# Patient Record
Sex: FEMALE
[De-identification: names, ages, dates, MRNs, and addresses within clinical notes are randomized per-mention and may not be internally consistent; named-entity substitution may affect disease eponyms.]

---

## 2023-11-01 PROBLEM — Z00.00 ENCOUNTER FOR PREVENTIVE HEALTH EXAMINATION: Status: ACTIVE | Noted: 2023-11-01

## 2023-11-02 ENCOUNTER — APPOINTMENT (OUTPATIENT)
Dept: ANTEPARTUM | Facility: CLINIC | Age: 38
End: 2023-11-02
Payer: MEDICAID

## 2023-11-02 ENCOUNTER — ASOB RESULT (OUTPATIENT)
Age: 38
End: 2023-11-02

## 2023-11-02 PROCEDURE — 76813 OB US NUCHAL MEAS 1 GEST: CPT | Mod: 59

## 2023-11-02 PROCEDURE — 76801 OB US < 14 WKS SINGLE FETUS: CPT

## 2023-11-29 ENCOUNTER — APPOINTMENT (OUTPATIENT)
Dept: ANTEPARTUM | Facility: CLINIC | Age: 38
End: 2023-11-29
Payer: MEDICAID

## 2023-11-29 ENCOUNTER — ASOB RESULT (OUTPATIENT)
Age: 38
End: 2023-11-29

## 2023-11-29 PROCEDURE — 76805 OB US >/= 14 WKS SNGL FETUS: CPT

## 2023-12-18 ENCOUNTER — APPOINTMENT (OUTPATIENT)
Dept: MATERNAL FETAL MEDICINE | Facility: CLINIC | Age: 38
End: 2023-12-18
Payer: MEDICAID

## 2023-12-18 ENCOUNTER — ASOB RESULT (OUTPATIENT)
Age: 38
End: 2023-12-18

## 2023-12-18 DIAGNOSIS — O28.9 UNSPECIFIED ABNORMAL FINDINGS ON ANTENATAL SCREENING OF MOTHER: ICD-10-CM

## 2023-12-18 PROCEDURE — 99204 OFFICE O/P NEW MOD 45 MIN: CPT | Mod: 95

## 2023-12-19 PROBLEM — O28.9 ABNORMAL FINDING ON ANTENATAL SCREEN: Status: ACTIVE | Noted: 2023-12-19

## 2023-12-28 ENCOUNTER — ASOB RESULT (OUTPATIENT)
Age: 38
End: 2023-12-28

## 2023-12-28 ENCOUNTER — APPOINTMENT (OUTPATIENT)
Dept: ANTEPARTUM | Facility: CLINIC | Age: 38
End: 2023-12-28
Payer: MEDICAID

## 2023-12-28 PROCEDURE — 76811 OB US DETAILED SNGL FETUS: CPT

## 2023-12-29 LAB
CMV IGG SERPL QL: 3.8 U/ML
CMV IGG SERPL-IMP: POSITIVE
CMV IGM SERPL QL: <8 AU/ML
CMV IGM SERPL QL: NEGATIVE

## 2024-01-03 LAB — CMV IGG AVIDITY SERPL IA-RTO: 0.88

## 2024-01-04 ENCOUNTER — NON-APPOINTMENT (OUTPATIENT)
Age: 39
End: 2024-01-04

## 2024-01-10 ENCOUNTER — TRANSCRIPTION ENCOUNTER (OUTPATIENT)
Age: 39
End: 2024-01-10

## 2024-01-12 ENCOUNTER — ASOB RESULT (OUTPATIENT)
Age: 39
End: 2024-01-12

## 2024-01-12 ENCOUNTER — APPOINTMENT (OUTPATIENT)
Dept: ANTEPARTUM | Facility: CLINIC | Age: 39
End: 2024-01-12
Payer: MEDICAID

## 2024-01-12 PROCEDURE — 99213 OFFICE O/P EST LOW 20 MIN: CPT | Mod: 25

## 2024-01-12 PROCEDURE — 76816 OB US FOLLOW-UP PER FETUS: CPT

## 2024-02-28 ENCOUNTER — APPOINTMENT (OUTPATIENT)
Dept: ANTEPARTUM | Facility: CLINIC | Age: 39
End: 2024-02-28
Payer: MEDICAID

## 2024-02-28 ENCOUNTER — ASOB RESULT (OUTPATIENT)
Age: 39
End: 2024-02-28

## 2024-02-28 PROCEDURE — 76816 OB US FOLLOW-UP PER FETUS: CPT

## 2024-03-28 ENCOUNTER — ASOB RESULT (OUTPATIENT)
Age: 39
End: 2024-03-28

## 2024-03-28 ENCOUNTER — APPOINTMENT (OUTPATIENT)
Dept: ANTEPARTUM | Facility: CLINIC | Age: 39
End: 2024-03-28
Payer: MEDICAID

## 2024-03-28 PROCEDURE — 76816 OB US FOLLOW-UP PER FETUS: CPT

## 2024-04-25 ENCOUNTER — ASOB RESULT (OUTPATIENT)
Age: 39
End: 2024-04-25

## 2024-04-25 ENCOUNTER — APPOINTMENT (OUTPATIENT)
Dept: ANTEPARTUM | Facility: CLINIC | Age: 39
End: 2024-04-25
Payer: MEDICAID

## 2024-04-25 PROCEDURE — 76819 FETAL BIOPHYS PROFIL W/O NST: CPT | Mod: 59

## 2024-04-25 PROCEDURE — 76816 OB US FOLLOW-UP PER FETUS: CPT

## 2024-05-07 ENCOUNTER — INPATIENT (INPATIENT)
Facility: HOSPITAL | Age: 39
LOS: 2 days | Discharge: HOME | End: 2024-05-10
Attending: OBSTETRICS & GYNECOLOGY | Admitting: OBSTETRICS & GYNECOLOGY
Payer: MEDICAID

## 2024-05-07 VITALS
OXYGEN SATURATION: 100 % | TEMPERATURE: 98 F | HEART RATE: 76 BPM | DIASTOLIC BLOOD PRESSURE: 99 MMHG | SYSTOLIC BLOOD PRESSURE: 136 MMHG | RESPIRATION RATE: 18 BRPM

## 2024-05-07 DIAGNOSIS — Z34.80 ENCOUNTER FOR SUPERVISION OF OTHER NORMAL PREGNANCY, UNSPECIFIED TRIMESTER: ICD-10-CM

## 2024-05-07 DIAGNOSIS — O26.899 OTHER SPECIFIED PREGNANCY RELATED CONDITIONS, UNSPECIFIED TRIMESTER: ICD-10-CM

## 2024-05-07 LAB
ABO RH CONFIRMATION: SIGNIFICANT CHANGE UP
ALBUMIN SERPL ELPH-MCNC: 2.7 G/DL — LOW (ref 3.5–5)
ALLERGY+IMMUNOLOGY DIAG STUDY NOTE: SIGNIFICANT CHANGE UP
ALP SERPL-CCNC: 149 U/L — HIGH (ref 40–120)
ALT FLD-CCNC: 21 U/L DA — SIGNIFICANT CHANGE UP (ref 10–60)
ANION GAP SERPL CALC-SCNC: 4 MMOL/L — LOW (ref 5–17)
APPEARANCE UR: ABNORMAL
APTT BLD: 29 SEC — SIGNIFICANT CHANGE UP (ref 24.5–35.6)
AST SERPL-CCNC: 20 U/L — SIGNIFICANT CHANGE UP (ref 10–40)
BACTERIA # UR AUTO: ABNORMAL /HPF
BASOPHILS # BLD AUTO: 0.04 K/UL — SIGNIFICANT CHANGE UP (ref 0–0.2)
BASOPHILS NFR BLD AUTO: 0.4 % — SIGNIFICANT CHANGE UP (ref 0–2)
BILIRUB SERPL-MCNC: 0.3 MG/DL — SIGNIFICANT CHANGE UP (ref 0.2–1.2)
BILIRUB UR-MCNC: NEGATIVE — SIGNIFICANT CHANGE UP
BLD GP AB SCN SERPL QL: SIGNIFICANT CHANGE UP
BUN SERPL-MCNC: 11 MG/DL — SIGNIFICANT CHANGE UP (ref 7–18)
CALCIUM SERPL-MCNC: 9 MG/DL — SIGNIFICANT CHANGE UP (ref 8.4–10.5)
CHLORIDE SERPL-SCNC: 109 MMOL/L — HIGH (ref 96–108)
CO2 SERPL-SCNC: 25 MMOL/L — SIGNIFICANT CHANGE UP (ref 22–31)
COLOR SPEC: YELLOW — SIGNIFICANT CHANGE UP
COMMENT - URINE: SIGNIFICANT CHANGE UP
CREAT ?TM UR-MCNC: 44 MG/DL — SIGNIFICANT CHANGE UP
CREAT ?TM UR-MCNC: 46 MG/DL — SIGNIFICANT CHANGE UP
CREAT SERPL-MCNC: 0.62 MG/DL — SIGNIFICANT CHANGE UP (ref 0.5–1.3)
DIFF PNL FLD: NEGATIVE — SIGNIFICANT CHANGE UP
EGFR: 117 ML/MIN/1.73M2 — SIGNIFICANT CHANGE UP
EOSINOPHIL # BLD AUTO: 0.05 K/UL — SIGNIFICANT CHANGE UP (ref 0–0.5)
EOSINOPHIL NFR BLD AUTO: 0.5 % — SIGNIFICANT CHANGE UP (ref 0–6)
EPI CELLS # UR: PRESENT
FIBRINOGEN PPP-MCNC: 432 MG/DL — SIGNIFICANT CHANGE UP (ref 200–475)
GLUCOSE SERPL-MCNC: 69 MG/DL — LOW (ref 70–99)
GLUCOSE UR QL: NEGATIVE MG/DL — SIGNIFICANT CHANGE UP
HCT VFR BLD CALC: 36.6 % — SIGNIFICANT CHANGE UP (ref 34.5–45)
HGB BLD-MCNC: 12.1 G/DL — SIGNIFICANT CHANGE UP (ref 11.5–15.5)
HYALINE CASTS # UR AUTO: PRESENT
IMM GRANULOCYTES NFR BLD AUTO: 0.9 % — SIGNIFICANT CHANGE UP (ref 0–0.9)
INR BLD: 0.83 RATIO — LOW (ref 0.85–1.18)
KETONES UR-MCNC: NEGATIVE MG/DL — SIGNIFICANT CHANGE UP
LDH SERPL L TO P-CCNC: 214 U/L — SIGNIFICANT CHANGE UP (ref 120–225)
LEUKOCYTE ESTERASE UR-ACNC: NEGATIVE — SIGNIFICANT CHANGE UP
LYMPHOCYTES # BLD AUTO: 1.45 K/UL — SIGNIFICANT CHANGE UP (ref 1–3.3)
LYMPHOCYTES # BLD AUTO: 13.1 % — SIGNIFICANT CHANGE UP (ref 13–44)
MCHC RBC-ENTMCNC: 29.8 PG — SIGNIFICANT CHANGE UP (ref 27–34)
MCHC RBC-ENTMCNC: 33.1 GM/DL — SIGNIFICANT CHANGE UP (ref 32–36)
MCV RBC AUTO: 90.1 FL — SIGNIFICANT CHANGE UP (ref 80–100)
MONOCYTES # BLD AUTO: 0.93 K/UL — HIGH (ref 0–0.9)
MONOCYTES NFR BLD AUTO: 8.4 % — SIGNIFICANT CHANGE UP (ref 2–14)
NEUTROPHILS # BLD AUTO: 8.54 K/UL — HIGH (ref 1.8–7.4)
NEUTROPHILS NFR BLD AUTO: 76.7 % — SIGNIFICANT CHANGE UP (ref 43–77)
NITRITE UR-MCNC: NEGATIVE — SIGNIFICANT CHANGE UP
NRBC # BLD: 0 /100 WBCS — SIGNIFICANT CHANGE UP (ref 0–0)
PH UR: 7.5 — SIGNIFICANT CHANGE UP (ref 5–8)
PLATELET # BLD AUTO: 127 K/UL — LOW (ref 150–400)
POTASSIUM SERPL-MCNC: 4.3 MMOL/L — SIGNIFICANT CHANGE UP (ref 3.5–5.3)
POTASSIUM SERPL-SCNC: 4.3 MMOL/L — SIGNIFICANT CHANGE UP (ref 3.5–5.3)
PROT ?TM UR-MCNC: 25 MG/DL — HIGH (ref 0–12)
PROT SERPL-MCNC: 6.4 G/DL — SIGNIFICANT CHANGE UP (ref 6–8.3)
PROT UR-MCNC: ABNORMAL MG/DL
PROT/CREAT UR-RTO: 0.6 RATIO — HIGH (ref 0–0.2)
PROTHROM AB SERPL-ACNC: 9.5 SEC — SIGNIFICANT CHANGE UP (ref 9.5–13)
RBC # BLD: 4.06 M/UL — SIGNIFICANT CHANGE UP (ref 3.8–5.2)
RBC # FLD: 13.1 % — SIGNIFICANT CHANGE UP (ref 10.3–14.5)
RBC CASTS # UR COMP ASSIST: 0 /HPF — SIGNIFICANT CHANGE UP (ref 0–4)
SODIUM SERPL-SCNC: 138 MMOL/L — SIGNIFICANT CHANGE UP (ref 135–145)
SP GR SPEC: 1.01 — SIGNIFICANT CHANGE UP (ref 1–1.03)
URATE SERPL-MCNC: 6 MG/DL — SIGNIFICANT CHANGE UP (ref 2.5–7)
UROBILINOGEN FLD QL: 0.2 MG/DL — SIGNIFICANT CHANGE UP (ref 0.2–1)
WBC # BLD: 11.11 K/UL — HIGH (ref 3.8–10.5)
WBC # FLD AUTO: 11.11 K/UL — HIGH (ref 3.8–10.5)
WBC UR QL: 1 /HPF — SIGNIFICANT CHANGE UP (ref 0–5)

## 2024-05-07 PROCEDURE — 88304 TISSUE EXAM BY PATHOLOGIST: CPT | Mod: 26

## 2024-05-07 PROCEDURE — 88307 TISSUE EXAM BY PATHOLOGIST: CPT | Mod: 26

## 2024-05-07 DEVICE — SURGICEL FIBRILLAR 1 X 2": Type: IMPLANTABLE DEVICE | Status: FUNCTIONAL

## 2024-05-07 RX ORDER — CHLORHEXIDINE GLUCONATE 213 G/1000ML
1 SOLUTION TOPICAL DAILY
Refills: 0 | Status: DISCONTINUED | OUTPATIENT
Start: 2024-05-07 | End: 2024-05-08

## 2024-05-07 RX ORDER — SIMETHICONE 80 MG/1
80 TABLET, CHEWABLE ORAL EVERY 4 HOURS
Refills: 0 | Status: DISCONTINUED | OUTPATIENT
Start: 2024-05-07 | End: 2024-05-10

## 2024-05-07 RX ORDER — OXYCODONE HYDROCHLORIDE 5 MG/1
5 TABLET ORAL ONCE
Refills: 0 | Status: DISCONTINUED | OUTPATIENT
Start: 2024-05-07 | End: 2024-05-10

## 2024-05-07 RX ORDER — DIPHENHYDRAMINE HCL 50 MG
25 CAPSULE ORAL EVERY 6 HOURS
Refills: 0 | Status: DISCONTINUED | OUTPATIENT
Start: 2024-05-07 | End: 2024-05-10

## 2024-05-07 RX ORDER — KETOROLAC TROMETHAMINE 30 MG/ML
30 SYRINGE (ML) INJECTION EVERY 6 HOURS
Refills: 0 | Status: DISCONTINUED | OUTPATIENT
Start: 2024-05-07 | End: 2024-05-08

## 2024-05-07 RX ORDER — CEFAZOLIN SODIUM 1 G
2000 VIAL (EA) INJECTION ONCE
Refills: 0 | Status: COMPLETED | OUTPATIENT
Start: 2024-05-07 | End: 2024-05-07

## 2024-05-07 RX ORDER — TETANUS TOXOID, REDUCED DIPHTHERIA TOXOID AND ACELLULAR PERTUSSIS VACCINE, ADSORBED 5; 2.5; 8; 8; 2.5 [IU]/.5ML; [IU]/.5ML; UG/.5ML; UG/.5ML; UG/.5ML
0.5 SUSPENSION INTRAMUSCULAR ONCE
Refills: 0 | Status: DISCONTINUED | OUTPATIENT
Start: 2024-05-07 | End: 2024-05-10

## 2024-05-07 RX ORDER — CITRIC ACID/SODIUM CITRATE 300-500 MG
30 SOLUTION, ORAL ORAL ONCE
Refills: 0 | Status: COMPLETED | OUTPATIENT
Start: 2024-05-07 | End: 2024-05-07

## 2024-05-07 RX ORDER — LANOLIN
1 OINTMENT (GRAM) TOPICAL EVERY 6 HOURS
Refills: 0 | Status: DISCONTINUED | OUTPATIENT
Start: 2024-05-07 | End: 2024-05-10

## 2024-05-07 RX ORDER — SODIUM CHLORIDE 9 MG/ML
1000 INJECTION, SOLUTION INTRAVENOUS
Refills: 0 | Status: DISCONTINUED | OUTPATIENT
Start: 2024-05-07 | End: 2024-05-07

## 2024-05-07 RX ORDER — SODIUM CHLORIDE 9 MG/ML
1000 INJECTION, SOLUTION INTRAVENOUS
Refills: 0 | Status: DISCONTINUED | OUTPATIENT
Start: 2024-05-07 | End: 2024-05-08

## 2024-05-07 RX ORDER — OXYTOCIN 10 UNIT/ML
333.33 VIAL (ML) INJECTION
Qty: 20 | Refills: 0 | Status: COMPLETED | OUTPATIENT
Start: 2024-05-07 | End: 2024-05-07

## 2024-05-07 RX ORDER — INFLUENZA VIRUS VACCINE 15; 15; 15; 15 UG/.5ML; UG/.5ML; UG/.5ML; UG/.5ML
0.5 SUSPENSION INTRAMUSCULAR ONCE
Refills: 0 | Status: DISCONTINUED | OUTPATIENT
Start: 2024-05-07 | End: 2024-05-10

## 2024-05-07 RX ORDER — MAGNESIUM HYDROXIDE 400 MG/1
30 TABLET, CHEWABLE ORAL
Refills: 0 | Status: DISCONTINUED | OUTPATIENT
Start: 2024-05-07 | End: 2024-05-10

## 2024-05-07 RX ORDER — ACETAMINOPHEN 500 MG
975 TABLET ORAL EVERY 6 HOURS
Refills: 0 | Status: DISCONTINUED | OUTPATIENT
Start: 2024-05-07 | End: 2024-05-10

## 2024-05-07 RX ORDER — FAMOTIDINE 10 MG/ML
20 INJECTION INTRAVENOUS ONCE
Refills: 0 | Status: COMPLETED | OUTPATIENT
Start: 2024-05-07 | End: 2024-05-07

## 2024-05-07 RX ORDER — IBUPROFEN 200 MG
600 TABLET ORAL EVERY 6 HOURS
Refills: 0 | Status: COMPLETED | OUTPATIENT
Start: 2024-05-07 | End: 2025-04-05

## 2024-05-07 RX ADMIN — Medication 100 MILLIGRAM(S): at 21:02

## 2024-05-07 RX ADMIN — FAMOTIDINE 20 MILLIGRAM(S): 10 INJECTION INTRAVENOUS at 20:44

## 2024-05-07 RX ADMIN — Medication 1000 MILLIUNIT(S)/MIN: at 21:43

## 2024-05-07 RX ADMIN — Medication 30 MILLILITER(S): at 20:44

## 2024-05-07 NOTE — OB PROVIDER TRIAGE NOTE - NSOBPROVIDERNOTE_OBGYN_ALL_OB_FT
37yo  presents with PEC w/o SF and r/o labor, pt stable      - PIH labs collected P/C ratio 0.6 plt 127  - Maternal and fetal monitoring   - BP monitoring    Dr. Barnard contacted 37yo  @ 39w1d  presents with PEC w/o SF, pt stable      - PIH labs collected P/C ratio 0.6 plt 127  - Maternal and fetal monitoring   - BP monitoring  - Pt for admission and elective  section     Dr. Barnard contacted

## 2024-05-07 NOTE — OB PROVIDER H&P - ASSESSMENT
39yo  @ 39w1d presents with PEC w/o SF, admitted for primary c/s, pt stable      - PIH labs collected P/C ratio 0.6 plt 127  - BP monitoring  - NST reviewed  - Continuous maternal and fetal monitoring  - Pre Op labs ordered   - Informed consent obtained at the bedside by Dr. Barnard   - Pain management options discussed   - Anesthesiology consulted    D/W Dr. Barnard

## 2024-05-07 NOTE — OB NEONATOLOGY/PEDIATRICIAN DELIVERY SUMMARY - NSPEDSNEONOTESA_OBGYN_ALL_OB_FT
Neonatologist, myself, called to the unscheduled, primary  of a 37yo  in the setting of pre-eclampsia and maternal hip deformity inhibiting ability to deliver vaginally. Maternal Serologies: O-/Antibody D positive/GBS-/HIV-/HepB-/RPR-/Rubella Immune. Mother notably s/p RhoGAM on 3/23/24. Pregnancy significant for maternal pre-eclampsia, otherwise unremarkable. Artificial rupture of membranes was just prior to delivery and significant for meconium stained amniotic fluid. Delivery significant for a live, viable, george female, loose nuchal x 1. Baby notably cried at delivery, was bulb suctioned by OB, cord clamping delayed for approximately 40 seconds. Baby was subsequently brought to the radiant warmer by OB where Peds assumed care. Baby was warmed/dried/stimulated, bulb suction of the nose and mouth performed, catheter suction of the nose, mouth and pharynx performed with removal of large amounts of thick, meconium-stained fluid. Baby did not require supplemental O2. APGARs 9/9 at 1 and 5 minutes of life respectively. Baby stable to remain on [facility].

## 2024-05-07 NOTE — OB PROVIDER DELIVERY SUMMARY - NSPROVIDERDELIVERYNOTE_OBGYN_ALL_OB_FT
ascites.  right adnexa - normal looking  left adnexa - normal apart from paratubal cyst  alive female fetus, cephalic, DREAD, light meconium, apgar 9/9, nuchal cord x 1 reducible, delayed cord clamping done  placenta complete looking, 3 vessel cord  left paratubal cyst removed  uterus contracted    ml  urine 100 ml clear at the end of the procedure  IV 2400 ml   specimen : placenta , cord blood , cord gases, left paratubal cyst

## 2024-05-07 NOTE — OB PROVIDER H&P - NSHPPHYSICALEXAM_GEN_ALL_CORE
Vital Signs Last 24 Hrs  T(C): 36.9 (07 May 2024 11:16), Max: 36.9 (07 May 2024 11:16)  T(F): 98.4 (07 May 2024 11:16), Max: 98.4 (07 May 2024 11:16)  HR: 76 (07 May 2024 11:16) (76 - 76)  BP: 136/99 (07 May 2024 11:16) (136/99 - 136/99)  BP(mean): --  RR: 18 (07 May 2024 11:16) (18 - 18)  SpO2: 100% (07 May 2024 11:16) (100% - 100%)    Parameters below as of 07 May 2024 11:16  Patient On (Oxygen Delivery Method): room air    General: Pt resting comfortably, NAD  Abd: Gravid, soft, non-tender  Ext: Soft, non-tender, no edema  SVE: 0/0/-3  NST: reactive, [id number], moderate variability, + accels, no decels   TOCO: occasional

## 2024-05-07 NOTE — OB PROVIDER H&P - HISTORY OF PRESENT ILLNESS
[id number] (Thai)   39 YO  at 39w1d LMP: 23 DENEEN: 24 presents with complaints of abdominal cramping q30 min that started today. Pt also has complaints of of increased LE edema. Pt state she has had swelling for the past 3 weeks but it has gotten worse in the last few days.  Pt notes +FM. Denies LOF, CTX, VB, HA, vision changes, Chest pain, SOB, epigastric pain, edema, N/V/D, or RUQ pain.    Prenatal care: [facility]     History  OBHx: Denies   GYNHx: Denies fibroids, cysts, STDs, or abnormal pap smears  PMH: One kidney is smaller than the other   Meds: Denies  PSHx:  Abdominal laparoscopy   Allergies: NKA  Social: Denies tobacco, EtOH, and drug use  Psych: Denies anxiety, depression, PTSD, or previous suicide attempts. Pt feels safe at home.

## 2024-05-07 NOTE — OB RN PATIENT PROFILE - FALL HARM RISK - UNIVERSAL INTERVENTIONS
Bed in lowest position, wheels locked, appropriate side rails in place/Call bell, personal items and telephone in reach/Instruct patient to call for assistance before getting out of bed or chair/Non-slip footwear when patient is out of bed/[place] to call system/Physically safe environment - no spills, clutter or unnecessary equipment/Purposeful Proactive Rounding/Room/bathroom lighting operational, light cord in reach

## 2024-05-07 NOTE — OB PROVIDER TRIAGE NOTE - NSHPPHYSICALEXAM_GEN_ALL_CORE
Vital Signs Last 24 Hrs  T(C): 36.9 (07 May 2024 11:16), Max: 36.9 (07 May 2024 11:16)  T(F): 98.4 (07 May 2024 11:16), Max: 98.4 (07 May 2024 11:16)  HR: 76 (07 May 2024 11:16) (76 - 76)  BP: 136/99 (07 May 2024 11:16) (136/99 - 136/99)  BP(mean): --  RR: 18 (07 May 2024 11:16) (18 - 18)  SpO2: 100% (07 May 2024 11:16) (100% - 100%)    Parameters below as of 07 May 2024 11:16  Patient On (Oxygen Delivery Method): room air    General: Pt resting comfortably, NAD  Abd: Gravid, soft, non-tender  Ext: Soft, non-tender, no edema  SVE: 0/0/-3  NST: reactive, [id number], moderate variability, + accels, no decels   TOCO: occasional

## 2024-05-07 NOTE — OB PROVIDER TRIAGE NOTE - NS_OBGYNHISTORY_OBGYN_ALL_OB_FT
History  OBHx: Denies   GYNHx: Denies fibroids, cysts, STDs, or abnormal pap smears  PMH: One kidney is smaller than the other   Meds: Denies  PSHx:  Abdominal laparoscopy   Allergies: NKA  Social: Denies tobacco, EtOH, and drug use  Psych: Denies anxiety, depression, PTSD, or previous suicide attempts. Pt feels safe at home.

## 2024-05-07 NOTE — OB RN PATIENT PROFILE - NS_OBGYNHISTORY_OBGYN_ALL_OB_FT
History  OBHx: Denies   GYNHx: Denies fibroids, cysts, STDs, or abnormal pap smears  PMH: One kidney is smaller than the other   Meds: Denies  PSHx:  Abdominal laparoscopy   Allergies: NKA  Social: Denies tobacco, EtOH, and drug use  Psych: Denies anxiety, depression, PTSD, or previous suicide attempts. Pt feels safe at home.    [facility], reports 2 metal rods in hips, wore brace x1 year. PMH: One kidney is smaller than the other   PSHx:  Abdominal laparoscopy (year unknown)    Reports b/l hip deformity at birth in [place], reports 2 metal rods in hips, wore brace x1 year

## 2024-05-07 NOTE — OB RN PATIENT PROFILE - NS_NUMBOFVISITS_OBGYN_ALL_OB_NU
DR Alejandar Carroll ALLERGIST  THE ONE HE HAS HE CAN'T USE BECAUSE THAT DR  DOES NOT PARTICIPATE WITH PT'S INS  HE WOULD LIKE A CALL WHEN READY     IT SHOULD JUST BE FOR AN ALLERGIST WITHOUT A DR  NAME ON IT  18

## 2024-05-07 NOTE — OB PROVIDER TRIAGE NOTE - HISTORY OF PRESENT ILLNESS
[id number] (Japanese)   39 YO  at 39w1d LMP: 23 DENEEN: 24 presents with complaints of abdominal cramping q30 min that started today. Pt also has complaints of of increased LE edema. Pt state she has had swelling for the past 3 weeks but it has gotten worse in the last few days.  Pt notes +FM. Denies LOF, CTX, VB, HA, vision changes, Chest pain, SOB, epigastric pain, edema, N/V/D, or RUQ pain.    Prenatal care: [facility]     History  OBHx: Denies   GYNHx: Denies fibroids, cysts, STDs, or abnormal pap smears  PMH: One kidney is smaller than the other   Meds: Denies  PSHx:  Abdominal laparoscopy   Allergies: NKA  Social: Denies tobacco, EtOH, and drug use  Psych: Denies anxiety, depression, PTSD, or previous suicide attempts. Pt feels safe at home.

## 2024-05-07 NOTE — OB RN TRIAGE NOTE - FALL HARM RISK - UNIVERSAL INTERVENTIONS
Bed in lowest position, wheels locked, appropriate side rails in place/Call bell, personal items and telephone in reach/Instruct patient to call for assistance before getting out of bed or chair/Non-slip footwear when patient is out of bed/[place] to call system/Physically safe environment - no spills, clutter or unnecessary equipment/Purposeful Proactive Rounding/Room/bathroom lighting operational, light cord in reach

## 2024-05-08 DIAGNOSIS — O14.90 UNSPECIFIED PRE-ECLAMPSIA, UNSPECIFIED TRIMESTER: ICD-10-CM

## 2024-05-08 LAB
BASOPHILS # BLD AUTO: 0.06 K/UL — SIGNIFICANT CHANGE UP (ref 0–0.2)
BASOPHILS NFR BLD AUTO: 0.4 % — SIGNIFICANT CHANGE UP (ref 0–2)
EOSINOPHIL # BLD AUTO: 0.02 K/UL — SIGNIFICANT CHANGE UP (ref 0–0.5)
EOSINOPHIL NFR BLD AUTO: 0.1 % — SIGNIFICANT CHANGE UP (ref 0–6)
HCT VFR BLD CALC: 31.9 % — LOW (ref 34.5–45)
HGB BLD-MCNC: 10.3 G/DL — LOW (ref 11.5–15.5)
IMM GRANULOCYTES NFR BLD AUTO: 0.8 % — SIGNIFICANT CHANGE UP (ref 0–0.9)
LYMPHOCYTES # BLD AUTO: 1.08 K/UL — SIGNIFICANT CHANGE UP (ref 1–3.3)
LYMPHOCYTES # BLD AUTO: 7.5 % — LOW (ref 13–44)
MCHC RBC-ENTMCNC: 29.6 PG — SIGNIFICANT CHANGE UP (ref 27–34)
MCHC RBC-ENTMCNC: 32.3 GM/DL — SIGNIFICANT CHANGE UP (ref 32–36)
MCV RBC AUTO: 91.7 FL — SIGNIFICANT CHANGE UP (ref 80–100)
MONOCYTES # BLD AUTO: 1.04 K/UL — HIGH (ref 0–0.9)
MONOCYTES NFR BLD AUTO: 7.2 % — SIGNIFICANT CHANGE UP (ref 2–14)
NEUTROPHILS # BLD AUTO: 12.1 K/UL — HIGH (ref 1.8–7.4)
NEUTROPHILS NFR BLD AUTO: 84 % — HIGH (ref 43–77)
NRBC # BLD: 0 /100 WBCS — SIGNIFICANT CHANGE UP (ref 0–0)
PLATELET # BLD AUTO: 112 K/UL — LOW (ref 150–400)
RBC # BLD: 3.48 M/UL — LOW (ref 3.8–5.2)
RBC # FLD: 13.3 % — SIGNIFICANT CHANGE UP (ref 10.3–14.5)
T PALLIDUM AB TITR SER: NEGATIVE — SIGNIFICANT CHANGE UP
WBC # BLD: 14.41 K/UL — HIGH (ref 3.8–10.5)
WBC # FLD AUTO: 14.41 K/UL — HIGH (ref 3.8–10.5)

## 2024-05-08 RX ORDER — HEPARIN SODIUM 5000 [USP'U]/ML
5000 INJECTION INTRAVENOUS; SUBCUTANEOUS EVERY 12 HOURS
Refills: 0 | Status: DISCONTINUED | OUTPATIENT
Start: 2024-05-08 | End: 2024-05-10

## 2024-05-08 RX ORDER — IBUPROFEN 200 MG
600 TABLET ORAL EVERY 6 HOURS
Refills: 0 | Status: DISCONTINUED | OUTPATIENT
Start: 2024-05-08 | End: 2024-05-10

## 2024-05-08 RX ORDER — SODIUM CHLORIDE 9 MG/ML
1000 INJECTION, SOLUTION INTRAVENOUS ONCE
Refills: 0 | Status: COMPLETED | OUTPATIENT
Start: 2024-05-08 | End: 2024-05-08

## 2024-05-08 RX ORDER — FAMOTIDINE 10 MG/ML
20 INJECTION INTRAVENOUS DAILY
Refills: 0 | Status: DISCONTINUED | OUTPATIENT
Start: 2024-05-08 | End: 2024-05-08

## 2024-05-08 RX ORDER — FAMOTIDINE 10 MG/ML
20 INJECTION INTRAVENOUS
Refills: 0 | Status: DISCONTINUED | OUTPATIENT
Start: 2024-05-08 | End: 2024-05-10

## 2024-05-08 RX ADMIN — SIMETHICONE 80 MILLIGRAM(S): 80 TABLET, CHEWABLE ORAL at 12:46

## 2024-05-08 RX ADMIN — Medication 30 MILLIGRAM(S): at 01:15

## 2024-05-08 RX ADMIN — HEPARIN SODIUM 5000 UNIT(S): 5000 INJECTION INTRAVENOUS; SUBCUTANEOUS at 09:37

## 2024-05-08 RX ADMIN — Medication 30 MILLIGRAM(S): at 12:44

## 2024-05-08 RX ADMIN — Medication 600 MILLIGRAM(S): at 18:51

## 2024-05-08 RX ADMIN — Medication 975 MILLIGRAM(S): at 22:06

## 2024-05-08 RX ADMIN — Medication 975 MILLIGRAM(S): at 16:30

## 2024-05-08 RX ADMIN — Medication 30 MILLIGRAM(S): at 07:32

## 2024-05-08 RX ADMIN — HEPARIN SODIUM 5000 UNIT(S): 5000 INJECTION INTRAVENOUS; SUBCUTANEOUS at 21:06

## 2024-05-08 RX ADMIN — Medication 975 MILLIGRAM(S): at 04:33

## 2024-05-08 RX ADMIN — Medication 975 MILLIGRAM(S): at 21:06

## 2024-05-08 RX ADMIN — Medication 30 MILLIGRAM(S): at 08:32

## 2024-05-08 RX ADMIN — Medication 1 TABLET(S): at 12:44

## 2024-05-08 RX ADMIN — Medication 30 MILLIGRAM(S): at 01:45

## 2024-05-08 RX ADMIN — Medication 600 MILLIGRAM(S): at 17:51

## 2024-05-08 RX ADMIN — Medication 975 MILLIGRAM(S): at 03:33

## 2024-05-08 RX ADMIN — FAMOTIDINE 20 MILLIGRAM(S): 10 INJECTION INTRAVENOUS at 17:51

## 2024-05-08 RX ADMIN — Medication 975 MILLIGRAM(S): at 15:31

## 2024-05-08 RX ADMIN — SODIUM CHLORIDE 1000 MILLILITER(S): 9 INJECTION, SOLUTION INTRAVENOUS at 07:50

## 2024-05-08 RX ADMIN — Medication 30 MILLIGRAM(S): at 13:44

## 2024-05-08 NOTE — OB RN DELIVERY SUMMARY - NS_SEPSISRSKCALC_OBGYN_ALL_OB_FT
EOS calculated successfully. EOS Risk Factor: 0.5/1000 live births ([facility] national incidence); GA=39w1d; Temp=98.4; ROM=0.017; GBS='Negative'; Antibiotics='No antibiotics or any antibiotics < 2 hrs prior to birth'

## 2024-05-09 DIAGNOSIS — D62 ACUTE POSTHEMORRHAGIC ANEMIA: ICD-10-CM

## 2024-05-09 LAB
BASOPHILS # BLD AUTO: 0.03 K/UL — SIGNIFICANT CHANGE UP (ref 0–0.2)
BASOPHILS NFR BLD AUTO: 0.3 % — SIGNIFICANT CHANGE UP (ref 0–2)
EOSINOPHIL # BLD AUTO: 0.07 K/UL — SIGNIFICANT CHANGE UP (ref 0–0.5)
EOSINOPHIL NFR BLD AUTO: 0.7 % — SIGNIFICANT CHANGE UP (ref 0–6)
HCT VFR BLD CALC: 29.9 % — LOW (ref 34.5–45)
HGB BLD-MCNC: 9.7 G/DL — LOW (ref 11.5–15.5)
IMM GRANULOCYTES NFR BLD AUTO: 1.4 % — HIGH (ref 0–0.9)
LYMPHOCYTES # BLD AUTO: 1.1 K/UL — SIGNIFICANT CHANGE UP (ref 1–3.3)
LYMPHOCYTES # BLD AUTO: 11.4 % — LOW (ref 13–44)
MCHC RBC-ENTMCNC: 29.7 PG — SIGNIFICANT CHANGE UP (ref 27–34)
MCHC RBC-ENTMCNC: 32.4 GM/DL — SIGNIFICANT CHANGE UP (ref 32–36)
MCV RBC AUTO: 91.4 FL — SIGNIFICANT CHANGE UP (ref 80–100)
MONOCYTES # BLD AUTO: 0.66 K/UL — SIGNIFICANT CHANGE UP (ref 0–0.9)
MONOCYTES NFR BLD AUTO: 6.9 % — SIGNIFICANT CHANGE UP (ref 2–14)
NEUTROPHILS # BLD AUTO: 7.62 K/UL — HIGH (ref 1.8–7.4)
NEUTROPHILS NFR BLD AUTO: 79.3 % — HIGH (ref 43–77)
NRBC # BLD: 0 /100 WBCS — SIGNIFICANT CHANGE UP (ref 0–0)
PLATELET # BLD AUTO: 121 K/UL — LOW (ref 150–400)
RBC # BLD: 3.27 M/UL — LOW (ref 3.8–5.2)
RBC # FLD: 13.5 % — SIGNIFICANT CHANGE UP (ref 10.3–14.5)
WBC # BLD: 9.61 K/UL — SIGNIFICANT CHANGE UP (ref 3.8–10.5)
WBC # FLD AUTO: 9.61 K/UL — SIGNIFICANT CHANGE UP (ref 3.8–10.5)

## 2024-05-09 RX ORDER — FERROUS SULFATE 325(65) MG
325 TABLET ORAL DAILY
Refills: 0 | Status: DISCONTINUED | OUTPATIENT
Start: 2024-05-09 | End: 2024-05-10

## 2024-05-09 RX ADMIN — Medication 975 MILLIGRAM(S): at 12:35

## 2024-05-09 RX ADMIN — Medication 1 TABLET(S): at 12:34

## 2024-05-09 RX ADMIN — HEPARIN SODIUM 5000 UNIT(S): 5000 INJECTION INTRAVENOUS; SUBCUTANEOUS at 10:40

## 2024-05-09 RX ADMIN — Medication 600 MILLIGRAM(S): at 18:02

## 2024-05-09 RX ADMIN — Medication 600 MILLIGRAM(S): at 19:02

## 2024-05-09 RX ADMIN — Medication 975 MILLIGRAM(S): at 13:35

## 2024-05-09 RX ADMIN — Medication 600 MILLIGRAM(S): at 06:47

## 2024-05-09 RX ADMIN — Medication 600 MILLIGRAM(S): at 00:43

## 2024-05-09 RX ADMIN — FAMOTIDINE 20 MILLIGRAM(S): 10 INJECTION INTRAVENOUS at 05:47

## 2024-05-09 RX ADMIN — SIMETHICONE 80 MILLIGRAM(S): 80 TABLET, CHEWABLE ORAL at 00:44

## 2024-05-09 RX ADMIN — Medication 600 MILLIGRAM(S): at 05:47

## 2024-05-09 RX ADMIN — Medication 325 MILLIGRAM(S): at 12:34

## 2024-05-09 RX ADMIN — Medication 975 MILLIGRAM(S): at 20:42

## 2024-05-09 RX ADMIN — HEPARIN SODIUM 5000 UNIT(S): 5000 INJECTION INTRAVENOUS; SUBCUTANEOUS at 21:11

## 2024-05-09 RX ADMIN — Medication 600 MILLIGRAM(S): at 01:45

## 2024-05-09 RX ADMIN — FAMOTIDINE 20 MILLIGRAM(S): 10 INJECTION INTRAVENOUS at 18:02

## 2024-05-09 RX ADMIN — Medication 975 MILLIGRAM(S): at 21:42

## 2024-05-10 ENCOUNTER — TRANSCRIPTION ENCOUNTER (OUTPATIENT)
Age: 39
End: 2024-05-10

## 2024-05-10 VITALS
DIASTOLIC BLOOD PRESSURE: 77 MMHG | OXYGEN SATURATION: 98 % | RESPIRATION RATE: 18 BRPM | SYSTOLIC BLOOD PRESSURE: 142 MMHG | HEART RATE: 74 BPM | TEMPERATURE: 97 F

## 2024-05-10 PROCEDURE — 88304 TISSUE EXAM BY PATHOLOGIST: CPT

## 2024-05-10 PROCEDURE — 59025 FETAL NON-STRESS TEST: CPT

## 2024-05-10 PROCEDURE — 84550 ASSAY OF BLOOD/URIC ACID: CPT

## 2024-05-10 PROCEDURE — 86900 BLOOD TYPING SEROLOGIC ABO: CPT

## 2024-05-10 PROCEDURE — 86870 RBC ANTIBODY IDENTIFICATION: CPT

## 2024-05-10 PROCEDURE — 85025 COMPLETE CBC W/AUTO DIFF WBC: CPT

## 2024-05-10 PROCEDURE — C1889: CPT

## 2024-05-10 PROCEDURE — 82570 ASSAY OF URINE CREATININE: CPT

## 2024-05-10 PROCEDURE — 83615 LACTATE (LD) (LDH) ENZYME: CPT

## 2024-05-10 PROCEDURE — 86922 COMPATIBILITY TEST ANTIGLOB: CPT

## 2024-05-10 PROCEDURE — 81001 URINALYSIS AUTO W/SCOPE: CPT

## 2024-05-10 PROCEDURE — 86780 TREPONEMA PALLIDUM: CPT

## 2024-05-10 PROCEDURE — 85730 THROMBOPLASTIN TIME PARTIAL: CPT

## 2024-05-10 PROCEDURE — 84156 ASSAY OF PROTEIN URINE: CPT

## 2024-05-10 PROCEDURE — 86901 BLOOD TYPING SEROLOGIC RH(D): CPT

## 2024-05-10 PROCEDURE — 36415 COLL VENOUS BLD VENIPUNCTURE: CPT

## 2024-05-10 PROCEDURE — 59050 FETAL MONITOR W/REPORT: CPT

## 2024-05-10 PROCEDURE — 85610 PROTHROMBIN TIME: CPT

## 2024-05-10 PROCEDURE — 80053 COMPREHEN METABOLIC PANEL: CPT

## 2024-05-10 PROCEDURE — 85384 FIBRINOGEN ACTIVITY: CPT

## 2024-05-10 PROCEDURE — 88307 TISSUE EXAM BY PATHOLOGIST: CPT

## 2024-05-10 PROCEDURE — 86850 RBC ANTIBODY SCREEN: CPT

## 2024-05-10 RX ORDER — IBUPROFEN 200 MG
1 TABLET ORAL
Qty: 20 | Refills: 0
Start: 2024-05-10 | End: 2024-05-14

## 2024-05-10 RX ORDER — ACETAMINOPHEN 500 MG
2 TABLET ORAL
Qty: 40 | Refills: 0
Start: 2024-05-10 | End: 2024-05-14

## 2024-05-10 RX ORDER — FERROUS SULFATE 325(65) MG
1 TABLET ORAL
Qty: 30 | Refills: 0
Start: 2024-05-10 | End: 2024-06-08

## 2024-05-10 RX ORDER — DOCUSATE SODIUM 100 MG
1 CAPSULE ORAL
Qty: 30 | Refills: 0
Start: 2024-05-10 | End: 2024-06-08

## 2024-05-10 RX ADMIN — Medication 600 MILLIGRAM(S): at 13:37

## 2024-05-10 RX ADMIN — SIMETHICONE 80 MILLIGRAM(S): 80 TABLET, CHEWABLE ORAL at 10:45

## 2024-05-10 RX ADMIN — Medication 600 MILLIGRAM(S): at 12:27

## 2024-05-10 RX ADMIN — Medication 975 MILLIGRAM(S): at 10:44

## 2024-05-10 RX ADMIN — Medication 600 MILLIGRAM(S): at 06:12

## 2024-05-10 RX ADMIN — SIMETHICONE 80 MILLIGRAM(S): 80 TABLET, CHEWABLE ORAL at 01:02

## 2024-05-10 RX ADMIN — Medication 600 MILLIGRAM(S): at 02:02

## 2024-05-10 RX ADMIN — Medication 975 MILLIGRAM(S): at 11:44

## 2024-05-10 RX ADMIN — SIMETHICONE 80 MILLIGRAM(S): 80 TABLET, CHEWABLE ORAL at 06:13

## 2024-05-10 RX ADMIN — Medication 1 TABLET(S): at 12:27

## 2024-05-10 RX ADMIN — Medication 600 MILLIGRAM(S): at 07:12

## 2024-05-10 RX ADMIN — Medication 325 MILLIGRAM(S): at 12:26

## 2024-05-10 RX ADMIN — FAMOTIDINE 20 MILLIGRAM(S): 10 INJECTION INTRAVENOUS at 06:12

## 2024-05-10 RX ADMIN — MAGNESIUM HYDROXIDE 30 MILLILITER(S): 400 TABLET, CHEWABLE ORAL at 06:14

## 2024-05-10 RX ADMIN — Medication 600 MILLIGRAM(S): at 01:02

## 2024-05-10 RX ADMIN — HEPARIN SODIUM 5000 UNIT(S): 5000 INJECTION INTRAVENOUS; SUBCUTANEOUS at 10:44

## 2024-05-10 NOTE — DISCHARGE NOTE OB - CARE PROVIDER_API CALL
Neil Barnard  Obstetrics and Gynecology  [address] [place] [address]  Phone: [phone]  Fax: [phone]  Follow Up Time: 1-3 days

## 2024-05-10 NOTE — DISCHARGE NOTE OB - PATIENT PORTAL LINK FT
You can access the FollowMyHealth Patient Portal offered by [facility] by registering at the following website: [email or web address]. By joining [facility]’s FollowMyHealth portal, you will also be able to view your health information using other applications (apps) compatible with our system.

## 2024-05-10 NOTE — PROGRESS NOTE ADULT - ASSESSMENT
pt seen w pp team and dr julian ROMEO NOTE:    POD#2    s/p: prim cs     pec w/o sf: bp reviewed     for case management for home care    -Pain management as ordered  -gi ppx  -dvt ppx  -OOB and ambulate  -encourage incentive spirometer use  -Encourage breastfeeding   -d/w dr bailey 
A/p: 39 yo POD #0 s/p c/s @ 39 weeks 1 day, primary c section due to congenital hip deformity, preeclampsia without severe features     -continue close monitoring   -continue post op care  - CBC in am   -d/w Dr. Barnard   
  A/P: POD #1 s/p primary  c/s d/t congenital hip deformity  c/b PEC w/o SF. Will remove spence at 11am; will remove dressing this afternoon.     -Pain management as needed  -cont post op care  -OOB and ambulate  - f/u CBC am  - heparin DVT ppx  - IVF 1L bolus LR   -encourage incentive spirometer use  -Encourage breastfeeding   -d/w dr. Barnard 
A/P: POD #3 s/p primary c/s 2/2 congenital hip deformity; acute blood loss anemia asymptomatic

## 2024-05-10 NOTE — LACTATION INITIAL EVALUATION - AS DELIV COMPLICATIONS OB
congenital Hip Deformity; see delivery summary/pre eclampsia
congenital Hip Deformity; see delivery summary/pre eclampsia

## 2024-05-10 NOTE — PROGRESS NOTE ADULT - REASON FOR ADMISSION
preeclampsia without severe features, primary c section due to congenital hip deformity
prim c/s , PEC w/o SF
sched c/s

## 2024-05-10 NOTE — LACTATION INITIAL EVALUATION - LACTATION INTERVENTIONS
Breastfeeding on cue 8-12X/24 hours with diaper count to assess for adequate intake, safe skin to skin and rooming-in encouraged.  pt. breast and formula feeds; reviewed safe formula feeding/prep inst.;  Scheduled for d/c home today with baby; attended mother-baby breastfeeding and d/c class; pt's questions addressed; Declined Referral to Tele-lactation program/initiate/review hand expression/post discharge community resources provided/review techniques to increase milk supply/review techniques to manage sore nipples/engorgement/reviewed components of an effective feeding and at least 8 effective feedings per day required/reviewed importance of monitoring infant diapers, the breastfeeding log, and minimum output each day/reviewed benefits and recommendations for rooming in/reviewed feeding on demand/by cue at least 8 times a day/reviewed indications of inadequate milk transfer that would require supplementation
Breastfeeding on cue 8-12X/24 hours with diaper count to assess for adequate intake, safe skin to skin and rooming-in encouraged./initiate/review hand expression/review techniques to increase milk supply/review techniques to manage sore nipples/engorgement/reviewed components of an effective feeding and at least 8 effective feedings per day required/reviewed importance of monitoring infant diapers, the breastfeeding log, and minimum output each day/reviewed benefits and recommendations for rooming in/reviewed feeding on demand/by cue at least 8 times a day

## 2024-05-10 NOTE — PROGRESS NOTE ADULT - PROBLEM SELECTOR PLAN 2
-continue close monitoring   -continue post op care  - CBC in am   -d/w Dr. Barnard
case management
-Pain management as needed  -cont post op care  -OOB and ambulate  - f/u CBC am  - heparin DVT ppx  - IVF 1L bolus LR   -encourage incentive spirometer use  -Encourage breastfeeding   -d/w dr. Barnard

## 2024-05-10 NOTE — PROGRESS NOTE ADULT - PROBLEM SELECTOR PLAN 1
-d/c home   -instructions verbalized  -follow up in 1-2weeks in office for incision check  -d/w dr Barnard
-Pain management as needed  -cont post op care  -OOB and ambulate  - f/u CBC am  - heparin DVT ppx  - IVF 1L bolus LR   -encourage incentive spirometer use  -Encourage breastfeeding   -d/w dr. Barnard
-continue close monitoring   -continue post op care  - CBC in am   -d/w Dr. Barnard

## 2024-05-10 NOTE — DISCHARGE NOTE OB - MEDICATION SUMMARY - MEDICATIONS TO TAKE
I will START or STAY ON the medications listed below when I get home from the hospital:    blood pressure kit  -- Apply on skin to affected area 4 times a day  -- Indication: For bp    ibuprofen 600 mg oral tablet  -- 1 tab(s) by mouth every 6 hours as needed for Moderate Pain (4 - 6)  -- Indication: For Pain    Tylenol Extra Strength 500 mg oral tablet  -- 2 tab(s) by mouth every 6 hours  -- Indication: For Pain    Prenatal Multivitamins with Folic Acid 1 mg oral tablet  -- 1 tab(s) by mouth once a day  -- Indication: For Supplementation    ferrous sulfate 325 mg (65 mg elemental iron) oral tablet  -- 1 tab(s) by mouth once a day  -- Indication: For anemia    Colace 100 mg oral capsule  -- 1 cap(s) by mouth once a day  -- Indication: For Constipation

## 2024-05-10 NOTE — LACTATION INITIAL EVALUATION - INFANT ACTIVITY
DATE OF PROCEDURE: 1/3/2018    DIAGNOSIS: Biliary colic [K80.50]    PROCEDURE: Procedure(s) (LRB):  CHOLECYSTECTOMY-LAPAROSCOPIC (N/A)    Surgeon(s) and Role:     * Elio Lam MD - Primary     * Bruce Wasserman MD - Resident - Assisting    ANESTHESIA: General.   PROCEDURE IN DETAIL: The patient was placed under general anesthesia. The   abdomen was prepped and draped in the usual manner. Access to peritoneum was   gained through the umbilicus using the Optiview trocar under direct vision.   Pneumoperitoneum to 15 mmHg with CO2 gas was obtained. Three 3 mm trocars were   placed under the right costal margin under direct vision at midline,   midclavicular line, and the anterior axial line. The gallbladder was pulled up   over the liver, exposing the triangle of Calot. Peritoneum was stripped off the   base of the gallbladder, exposing the cystic duct and artery as they entered   the gallbladder.  The cystic duct and artery were clipped divided.   The gallbladder was removed from the gallbladder bed using the hook cautery,   placed in an EndoCatch bag and removed from the abdomen through the navel.  The base was cauterized. The abdomen was irrigated, all irrigation fluid removed and   inspected for hemostasis. The trocars were removed under direct vision. Prior   to removing the last trocar, pneumoperitoneum was allowed to escape. The fascia   at the naval was closed with 0 Vicryl. The skin incisions were closed with 4-0   plain catgut, and reinforced with Mastisol, Steri-Strips, and Band-Aids. The   patient tolerated the procedure well and was brought to Recovery Room in stable   condition. Sponge and needle counts were correct at the end of the case.    Blood loss was min, complications were none, consent was obtained and pathology was gallbladder      
asleep
asleep

## 2024-05-10 NOTE — DISCHARGE NOTE OB - NS MD DC FALL RISK RISK
For information on Fall & Injury Prevention, visit: [email or web address] OR  [email or web address] OR  https://www.cdc.gov/steadi/patient.html

## 2024-05-10 NOTE — DISCHARGE NOTE OB - MATERIALS PROVIDED
[facility] [place] Screening Program/[place]  Immunization Record/Breastfeeding Log/Bottle Feeding Log/Breastfeeding Mother’s Support Group Information/Guide to Postpartum Care/[facility] Hearing Screen Program/Back To Sleep Handout/Shaken Baby Prevention Handout/Breastfeeding Guide and Packet/Birth Certificate Instructions/Discharge Medication Information for Patients and Families Pocket Guide/Letter of Medical Neccessity

## 2024-05-10 NOTE — LACTATION INITIAL EVALUATION - PRO FEEDING PLAN INFANT OB
How Severe Is Your Condition?: moderate
initiation of breastfeeding/breast milk feeding
initiation of breastfeeding/breast milk feeding

## 2024-05-10 NOTE — LACTATION INITIAL EVALUATION - ADDITIONAL HEALTH HISTORY COMMENTS
bilat. congenital hip deformity; chronic UTI; see H&P
bilat. congenital hip deformity; chronic UTI; see H&P

## 2024-05-10 NOTE — DISCHARGE NOTE OB - CARE PLAN
1 Principal Discharge DX:	 delivery delivered  Assessment and plan of treatment:	Continue breastfeeding.  Motrin as needed for pain.  Ambulate daily.  No heavy lifting or anything per vagina x 6 weeks - no sex, tampons, douching, tub baths, etc.  Follow up in office in 2 weeks for incision check, and then at 6 weeks for postpartum check.  Secondary Diagnosis:	Postoperative anemia due to acute blood loss  Assessment and plan of treatment:	take iron, folic acid, vitamin C, and prenatal vitamins. eat iron fortified food  Secondary Diagnosis:	Preeclampsia, third trimester  Assessment and plan of treatment:	follow-up with clinician on monday for blood pressure check, If systolic bp is >160 or diastolic >110 return to ER. Also return to ER if headache, epigastric pain, visual changes or increased swelling

## 2024-05-10 NOTE — DISCHARGE NOTE OB - PLAN OF CARE
Continue breastfeeding.  Motrin as needed for pain.  Ambulate daily.  No heavy lifting or anything per vagina x 6 weeks - no sex, tampons, douching, tub baths, etc.  Follow up in office in 2 weeks for incision check, and then at 6 weeks for postpartum check. take iron, folic acid, vitamin C, and prenatal vitamins. eat iron fortified food follow-up with clinician on monday for blood pressure check, If systolic bp is >160 or diastolic >110 return to ER. Also return to ER if headache, epigastric pain, visual changes or increased swelling

## 2024-05-10 NOTE — PROGRESS NOTE ADULT - SUBJECTIVE AND OBJECTIVE BOX
PA NOTE:    Patient seen at bedside resting comfortably offers no new complaints. Hidalgo in place, has not ambulated, + flatus; tolerating regular diet; improving pain. both breastfeeding and bottle feeding.   Denies HA, CP, SOB, N/V/D,  no bm; dizziness, palpitations, worsening abdominal pain, worsening vaginal bleeding, or any other concerns.     Vital Signs Last 24 Hrs  T(C): 36.7 (08 May 2024 06:55), Max: 36.9 (07 May 2024 11:16)  T(F): 98 (08 May 2024 06:55), Max: 98.4 (07 May 2024 11:16)  HR: 72 (08 May 2024 06:55) (67 - 109)  BP: 130/84 (08 May 2024 06:55) (127/79 - 150/83)  BP(mean): 95 (08 May 2024 03:04) (94 - 109)  RR: 16 (08 May 2024 06:55) (16 - 18)  SpO2: 98% (08 May 2024 06:55) (96% - 100%)    Parameters below as of 08 May 2024 06:55  Patient On (Oxygen Delivery Method): room air      CAPILLARY BLOOD GLUCOSE          Gen: A&O x 3, NAD  Chest:  saturating well on RA R  Breast: Soft, nontender, nonengorged  Abdomen:  soft; Nontender, nondistended, dressing C/D/I   Gyn: Minimal lochia  Extremities: Nontender, DTRS 2+, no worsening edema    CBC Full  -  ( 08 May 2024 06:40 )  WBC Count : 14.41 K/uL  RBC Count : 3.48 M/uL  Hemoglobin : 10.3 g/dL  Hematocrit : 31.9 %  Platelet Count - Automated : 112 K/uL  Mean Cell Volume : 91.7 fl  Mean Cell Hemoglobin : 29.6 pg  Mean Cell Hemoglobin Concentration : 32.3 gm/dL  Auto Neutrophil # : 12.10 K/uL  Auto Lymphocyte # : 1.08 K/uL  Auto Monocyte # : 1.04 K/uL  Auto Eosinophil # : 0.02 K/uL  Auto Basophil # : 0.06 K/uL  Auto Neutrophil % : 84.0 %  Auto Lymphocyte % : 7.5 %  Auto Monocyte % : 7.2 %  Auto Eosinophil % : 0.1 %  Auto Basophil % : 0.4 %    05-07    138  |  109<H>  |  11  ----------------------------<  69<L>  4.3   |  25  |  0.62    Ca    9.0      07 May 2024 13:10    TPro  6.4  /  Alb  2.7<L>  /  TBili  0.3  /  DBili  x   /  AST  20  /  ALT  21  /  AlkPhos  149<H>  05-07    LIVER FUNCTIONS - ( 07 May 2024 13:10 )  Alb: 2.7 g/dL / Pro: 6.4 g/dL / ALK PHOS: 149 U/L / ALT: 21 U/L DA / AST: 20 U/L / GGT: x           Urinalysis Basic - ( 07 May 2024 13:10 )    Color: Yellow / Appearance: Cloudy / S.009 / pH: x  Gluc: 69 mg/dL / Ketone: Negative mg/dL  / Bili: Negative / Urobili: 0.2 mg/dL   Blood: x / Protein: Trace mg/dL / Nitrite: Negative   Leuk Esterase: Negative / RBC: 0 /HPF / WBC 1 /HPF   Sq Epi: x / Non Sq Epi: x / Bacteria: Moderate /HPF      
Pt seen at bedside resting comfortably and offers no complaints.  Hidalgo catheter in place draining clear adequate urine. Pt denies CP, SOB, N/V, dizziness, palpitations or any other complaints.    T(C): 36.7 (05-07-24 @ 23:13), Max: 36.9 (05-07-24 @ 11:16)  HR: 74 (05-08-24 @ 00:45) (67 - 109)  BP: 140/96 (05-08-24 @ 00:45) (135/83 - 150/83)  RR: 16 (05-08-24 @ 00:45) (16 - 18)  SpO2: 98% (05-08-24 @ 00:45) (97% - 100%)    General: patient is resting comfortably in bed, NAD, A&Ox3  Cardiac: RRR  Pulmonary: clear to auscultation throughout   abd: soft/nontender, fundus firm, dressing in place C/D/I  pelvic: minimal lochia  ext: venodynes in place; no calf tenderness                         12.1   11.11  )----------(  127       ( 07 May 2024 13:10 )               36.6    
Patient seen at bedside resting comfortably offers no new complaints. + Ambulation, + void without difficulty, + flatus;  + bm;  tolerating regular diet. both breastfeeding and bottle feeding. Denies HA, blurry vision or epigastric pain, CP, SOB, N/V/D,  dizziness, palpitations, worsening vaginal bleeding.     Vital Signs Last 24 Hrs  T(C): 36.3 (10 May 2024 07:36), Max: 36.4 (09 May 2024 11:19)  T(F): 97.4 (10 May 2024 07:36), Max: 97.6 (09 May 2024 16:03)  HR: 74 (10 May 2024 07:36) (72 - 83)  BP: 142/77 (10 May 2024 07:36) (123/81 - 142/77)  BP(mean): 99 (10 May 2024 07:36) (99 - 99)  RR: 18 (10 May 2024 07:36) (18 - 18)  SpO2: 98% (10 May 2024 07:36) (97% - 99%)    Parameters below as of 10 May 2024 07:36  Patient On (Oxygen Delivery Method): room air    Gen: A&O x 3, NAD  Chest: CTA B/L  Cardiac: S1,S2  RRR  Breast: Soft, nontender, nonengorged  Abdomen: +BS; soft; Nontender, nondistended, Incision C/D/I steri strips in place   Gyn: Minimal lochia  Extremities: Nontender, DTRS 2+, no worsening edema                          9.7    9.61  )-----------( 121      ( 09 May 2024 06:05 )             29.9       
  pt seen w pp team and dr jordan    PA NOTE:    POD#2    s/p: prim cs     pec w/o sf: bp reviewed     for case management for home care      Patient seen at bedisde resting comfortably offers no new complaints. + Ambulation, + void without difficulty, + flatus; tolerating regular diet. both breastfeeding and bottle feeding. Denies HA, CP, SOB, N/V/D,  no bm; dizziness, palpitations, worsening abdominal pain, worsening vaginal bleeding, or any other concerns.     Vital Signs Last 24 Hrs  T(C): 36.7 (09 May 2024 07:30), Max: 36.8 (08 May 2024 11:16)  T(F): 98 (09 May 2024 07:30), Max: 98.3 (08 May 2024 11:16)  HR: 86 (09 May 2024 07:30) (77 - 97)  BP: 134/85 (09 May 2024 07:30) (129/76 - 143/91)  BP(mean): --  RR: 16 (09 May 2024 07:30) (16 - 18)  SpO2: 97% (09 May 2024 07:30) (95% - 98%)    Parameters below as of 09 May 2024 07:30  Patient On (Oxygen Delivery Method): room air      CAPILLARY BLOOD GLUCOSE          Gen: A&O x 3, NAD  Chest: CTABL  Cardiac: S1+S2+ RRR  Breast: Soft, nontender, nonengorged  Abdomen: +BS; soft; Nontender, nondistended,   incision site: C/D/I steri strips in place   Gyn: Minimal lochia  Extremities: Nontender, DTRS 2+, no worsening edema                            9.7    9.61  )-----------( 121      ( 09 May 2024 06:05 )             29.9

## 2024-05-16 ENCOUNTER — APPOINTMENT (OUTPATIENT)
Dept: ANTEPARTUM | Facility: CLINIC | Age: 39
End: 2024-05-16

## 2024-06-03 LAB — SURGICAL PATHOLOGY STUDY: SIGNIFICANT CHANGE UP

## 2024-09-17 ENCOUNTER — EMERGENCY (EMERGENCY)
Facility: HOSPITAL | Age: 39
LOS: 1 days | Discharge: HOME | End: 2024-09-17
Attending: EMERGENCY MEDICINE
Payer: SELF-PAY

## 2024-09-17 VITALS
TEMPERATURE: 98 F | DIASTOLIC BLOOD PRESSURE: 91 MMHG | HEART RATE: 79 BPM | OXYGEN SATURATION: 100 % | RESPIRATION RATE: 19 BRPM | SYSTOLIC BLOOD PRESSURE: 133 MMHG | WEIGHT: 185.19 LBS

## 2024-09-17 PROCEDURE — 70450 CT HEAD/BRAIN W/O DYE: CPT | Mod: 26,MC

## 2024-09-17 PROCEDURE — 72125 CT NECK SPINE W/O DYE: CPT | Mod: 26,MC

## 2024-09-17 PROCEDURE — 70450 CT HEAD/BRAIN W/O DYE: CPT | Mod: MC

## 2024-09-17 PROCEDURE — 99284 EMERGENCY DEPT VISIT MOD MDM: CPT

## 2024-09-17 PROCEDURE — 99284 EMERGENCY DEPT VISIT MOD MDM: CPT | Mod: 25

## 2024-09-17 PROCEDURE — 72125 CT NECK SPINE W/O DYE: CPT | Mod: MC

## 2024-09-17 RX ORDER — ACETAMINOPHEN 325 MG/1
650 TABLET ORAL ONCE
Refills: 0 | Status: COMPLETED | OUTPATIENT
Start: 2024-09-17 | End: 2024-09-17

## 2024-09-17 RX ADMIN — ACETAMINOPHEN 650 MILLIGRAM(S): 325 TABLET ORAL at 23:51
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
## 2024-09-17 NOTE — ED PROVIDER NOTE - PHYSICAL EXAMINATION
heart regular lungs clear no midline spinal tenderness palpation right neck paraspinal tenderness palpation left temple and parietal region tenderness to palpation no bruising no crepitus.  TM clear and intact bilaterally strength sensation intact in both arms and legs.  No bruising on the back and abdomen and nontender. heart regular lungs clear no midline spinal tenderness palpation. + right neck paraspinal tenderness palpation, left temple and parietal region tenderness to palpation, no bruising no crepitus.  TM clear and intact bilaterally strength sensation intact in both arms and legs.  No bruising on the back and abdomen and nontender.No midline spinal tenderness palpation to the remainder of the back.  Ambulatory steady gait awake alert not in any distress

## 2024-09-17 NOTE — ED PROVIDER NOTE - PATIENT PORTAL LINK FT
You can access the FollowMyHealth Patient Portal offered by [facility] by registering at the following website: [email or web address]. By joining [employer]’s FollowMyHealth portal, you will also be able to view your health information using other applications (apps) compatible with our system.

## 2024-09-17 NOTE — ED ADULT NURSE NOTE - NSFALLUNIVINTERV_ED_ALL_ED
Bed/Stretcher in lowest position, wheels locked, appropriate side rails in place/Call bell, personal items and telephone in reach/Instruct patient to call for assistance before getting out of bed/chair/stretcher/Non-slip footwear applied when patient is off stretcher/[place] to call system/Physically safe environment - no spills, clutter or unnecessary equipment/Purposeful proactive rounding/Room/bathroom lighting operational, light cord in reach
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
## 2024-09-17 NOTE — ED PROVIDER NOTE - NSFOLLOWUPINSTRUCTIONS_ED_ALL_ED_FT
Motor Vehicle Collision Injury, Adult  After a motor vehicle collision, it is common to have injuries to the head, face, arms, and body. These injuries may include cuts, burns, and bruises. The collision can also cause sore muscles, muscle strains, headaches, and broken bones.    You may have stiffness and soreness for the first several hours. You may feel worse after waking up the first morning after the collision. These injuries tend to feel worse for the first 24–48 hours. Your injuries should then begin to improve with each day. How quickly you improve often depends on:  The severity of the collision.  The number of injuries you have.  The location and nature of the injuries.  Whether you were wearing a seat belt and whether your airbag deployed.  A head injury may result in a concussion, which is a brain injury that can have serious effects. If you have a concussion, you should rest as told by your health care provider. You must be very careful to avoid having a second concussion.    Follow these instructions at home:  Medicines    Take over-the-counter and prescription medicines only as told by your health care provider.  If you were prescribed antibiotics, take or apply it as told by your health care provider. Do not stop using the antibiotic even if you start to feel better.  Wound or burn care    Two wounds closed with skin glue. One is normal. The other is red with pus and infected.  Follow instructions from your health care provider about how to take care of your wound or burn. Make sure you:  Clean your wound or burn. To do this:  Wash it with mild soap and water.  Rinse it with water to remove all soap.  Pat it dry with a clean towel. Do not rub it.  Put an ointment or cream on the wound, if you were told to do so.  Know when and how to change or remove your bandage (dressing). Always wash your hands with soap and water for at least 20 seconds before and after you change your dressing. If soap and water are not available, use hand .  Leave any stitches (sutures), skin glue, or adhesive strips in place. These skin closures may need to stay in place for 2 weeks or longer. If adhesive strip edges start to loosen and curl up, you may trim the loose edges. Do not remove adhesive strips completely unless your health care provider tells you to do that.  Avoid exposing your burn or wound to the sun.  Keep the surface of the wound or burn intact.  Do not scratch or pick at the wound or burn.  Do not break any blisters you may have.  Do not peel any skin.  Check your wound or burn every day for signs of infection. Check for:  Redness, swelling, or pain.  Fluid or blood.  Warmth.  Pus or a bad smell.  Managing pain, stiffness, and swelling    Bag of ice on a towel on the skin.  If directed, put ice on the injured areas. This can help with pain and swelling. To do this:  Put ice in a plastic bag.  Place a towel between your skin and the bag.  Leave the ice on for 20 minutes, 2–3 times a day.  If your skin turns bright red, remove the ice right away to prevent skin damage. The risk of skin damage is higher if you cannot feel pain, heat, or cold.  Raise (elevate) the wound or burn above the level of your heart while you are sitting or lying down. This will help reduce pain, pressure, and swelling.  If you have a wound or burn on your face, you may want to sleep with your head elevated. You may do this by putting an extra pillow under your head.  Activity    Rest. Rest helps your body to heal. Make sure you:  Get plenty of sleep at night. Avoid staying up late.  Keep the same bedtime hours on weekends and weekdays.  You may have to avoid lifting. Ask your health care provider how much you can safely lift. Lifting can make neck or back pain worse.  Ask your health care provider when you can drive, ride a bicycle, or use machinery. Your ability to react may be slower if you injured your head. Do not do these activities if you are dizzy.  General instructions    If you have a splint, brace, or sling, follow your health care provider's instructions on how to use your device.  Drink enough fluid to keep your urine pale yellow.  Do not drink alcohol.  Eat a healthy diet. Ask your health care provider what foods you should eat.  Contact a health care provider if:  You have any new or worsening symptoms, such as:  A worsening headache  Pain or swelling in an arm or leg.  Numbness, tingling, or weakness in your arms or legs.  Trouble moving an arm or leg.  New neck or back pain.  Nausea or vomiting  You have signs of infection in a wound or burn.  You have a fever.  You have a head injury and any of the following symptoms for more than 2 weeks after your motor vehicle collision:  Headaches that do not go away.  Dizziness or balance problems.  Nausea or vomiting.  Increased sensitivity to noise or light.  Depression, anxiety, or irritability and mood swings.  Memory problems or trouble concentrating.  Sleep problems or feeling more tired than usual.  You have changes in bowel or bladder control.  You have blood in your urine, stool, or you vomit.  Get help right away if:  You have increasing pain in the chest, neck, back, or abdomen.  You have shortness of breath.  These symptoms may be an emergency. Get help right away. Call 911.  Do not wait to see if the symptoms will go away.  Do not drive yourself to the hospital.  This information is not intended to replace advice given to you by your health care provider. Make sure you discuss any questions you have with your health care provider.    Document Revised: 06/12/2023 Document Reviewed: 06/12/2023  [employer] Patient Education © 2024 [employer]
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
## 2024-09-18 RX ADMIN — ACETAMINOPHEN 650 MILLIGRAM(S): 325 TABLET ORAL at 00:01

## 2025-04-04 NOTE — OB RN DELIVERY SUMMARY - NS_CULTURES_OBGYN_ALL_OB
04/04/25                     0819  Spoke to patient Dr. Wang wanted to see her in office if she was not feeling any better at the [place] location. Patient stated she is feeling better and the swelling around her incision has gone down. Pt stated she is just coughing. Pt declined appointment for today. Pt stated she would see Dr. Wang for post-op. Pt requested an earlier appointment for post-op instead of the 04/15 she will be seen in the 04/08/25 at the [facility]. Pt confirm appointment. Pt verbalized understanding.   No